# Patient Record
Sex: MALE | Race: BLACK OR AFRICAN AMERICAN | NOT HISPANIC OR LATINO | RURAL
[De-identification: names, ages, dates, MRNs, and addresses within clinical notes are randomized per-mention and may not be internally consistent; named-entity substitution may affect disease eponyms.]

---

## 2023-01-01 ENCOUNTER — HOSPITAL ENCOUNTER (EMERGENCY)
Facility: HOSPITAL | Age: 43
Discharge: HOME | End: 2023-08-18
Attending: EMERGENCY MEDICINE
Payer: COMMERCIAL

## 2023-01-01 ENCOUNTER — TELEPHONE (OUTPATIENT)
Dept: EMERGENCY MEDICINE | Facility: HOSPITAL | Age: 43
End: 2023-01-01
Payer: COMMERCIAL

## 2023-01-01 VITALS
SYSTOLIC BLOOD PRESSURE: 98 MMHG | HEIGHT: 74 IN | BODY MASS INDEX: 25.67 KG/M2 | TEMPERATURE: 99 F | WEIGHT: 200 LBS | HEART RATE: 99 BPM | RESPIRATION RATE: 18 BRPM | OXYGEN SATURATION: 98 % | DIASTOLIC BLOOD PRESSURE: 67 MMHG

## 2023-01-01 DIAGNOSIS — I95.9 HYPOTENSION: Primary | ICD-10-CM

## 2023-01-01 DIAGNOSIS — E86.0 DEHYDRATION: ICD-10-CM

## 2023-01-01 DIAGNOSIS — R73.9 HYPERGLYCEMIA: ICD-10-CM

## 2023-01-01 LAB
ALBUMIN SERPL BCP-MCNC: 3.9 G/DL (ref 3.5–5)
ALBUMIN/GLOB SERPL: 0.9 {RATIO}
ALP SERPL-CCNC: 70 U/L (ref 45–115)
ALT SERPL W P-5'-P-CCNC: 27 U/L (ref 16–61)
ANION GAP SERPL CALCULATED.3IONS-SCNC: 12 MMOL/L (ref 7–16)
AST SERPL W P-5'-P-CCNC: 18 U/L (ref 15–37)
BACTERIA #/AREA URNS HPF: NORMAL /HPF
BASOPHILS # BLD AUTO: 0.02 K/UL (ref 0–0.2)
BASOPHILS NFR BLD AUTO: 0.4 % (ref 0–1)
BILIRUB SERPL-MCNC: 0.5 MG/DL (ref ?–1.2)
BILIRUB UR QL STRIP: NEGATIVE
BUN SERPL-MCNC: 63 MG/DL (ref 7–18)
BUN/CREAT SERPL: 33 (ref 6–20)
CALCIUM SERPL-MCNC: 9.5 MG/DL (ref 8.5–10.1)
CHLORIDE SERPL-SCNC: 101 MMOL/L (ref 98–107)
CK SERPL-CCNC: 87 U/L (ref 39–308)
CLARITY UR: CLEAR
CO2 SERPL-SCNC: 26 MMOL/L (ref 21–32)
COLOR UR: YELLOW
CREAT SERPL-MCNC: 1.91 MG/DL (ref 0.7–1.3)
DIFFERENTIAL METHOD BLD: ABNORMAL
EGFR (NO RACE VARIABLE) (RUSH/TITUS): 45 ML/MIN/1.73M2
EOSINOPHIL # BLD AUTO: 0.12 K/UL (ref 0–0.5)
EOSINOPHIL NFR BLD AUTO: 2.5 % (ref 1–4)
EOSINOPHIL NFR BLD MANUAL: 4 % (ref 1–4)
ERYTHROCYTE [DISTWIDTH] IN BLOOD BY AUTOMATED COUNT: 14.8 % (ref 11.5–14.5)
GLOBULIN SER-MCNC: 4.3 G/DL (ref 2–4)
GLUCOSE SERPL-MCNC: 199 MG/DL (ref 74–106)
GLUCOSE UR STRIP-MCNC: NEGATIVE MG/DL
HCT VFR BLD AUTO: 33.5 % (ref 40–54)
HGB BLD-MCNC: 11.8 G/DL (ref 13.5–18)
KETONES UR STRIP-SCNC: NEGATIVE MG/DL
LEUKOCYTE ESTERASE UR QL STRIP: NEGATIVE
LYMPHOCYTES # BLD AUTO: 0.75 K/UL (ref 1–4.8)
LYMPHOCYTES NFR BLD AUTO: 15.5 % (ref 27–41)
LYMPHOCYTES NFR BLD MANUAL: 16 % (ref 27–41)
MCH RBC QN AUTO: 28.2 PG (ref 27–31)
MCHC RBC AUTO-ENTMCNC: 35.2 G/DL (ref 32–36)
MCV RBC AUTO: 80 FL (ref 80–96)
MONOCYTES # BLD AUTO: 0.83 K/UL (ref 0–0.8)
MONOCYTES NFR BLD AUTO: 17.1 % (ref 2–6)
MONOCYTES NFR BLD MANUAL: 18 % (ref 2–6)
MPC BLD CALC-MCNC: 9.4 FL (ref 9.4–12.4)
NEUTROPHILS # BLD AUTO: 3.12 K/UL (ref 1.8–7.7)
NEUTROPHILS NFR BLD AUTO: 64.5 % (ref 53–65)
NEUTS BAND NFR BLD MANUAL: 1 % (ref 1–5)
NEUTS SEG NFR BLD MANUAL: 61 % (ref 50–62)
NITRITE UR QL STRIP: NEGATIVE
NRBC BLD MANUAL-RTO: ABNORMAL %
PH UR STRIP: 5 PH UNITS
PLATELET # BLD AUTO: 218 K/UL (ref 150–400)
PLATELET MORPHOLOGY: NORMAL
POTASSIUM SERPL-SCNC: 4.3 MMOL/L (ref 3.5–5.1)
PROT SERPL-MCNC: 8.2 G/DL (ref 6.4–8.2)
PROT UR QL STRIP: NEGATIVE
RBC # BLD AUTO: 4.19 M/UL (ref 4.6–6.2)
RBC # UR STRIP: ABNORMAL /UL
RBC #/AREA URNS HPF: NORMAL /HPF
RBC MORPH BLD: NORMAL
SODIUM SERPL-SCNC: 135 MMOL/L (ref 136–145)
SP GR UR STRIP: 1.01
SQUAMOUS #/AREA URNS LPF: NORMAL /LPF
TROPONIN I SERPL DL<=0.01 NG/ML-MCNC: 41.9 PG/ML
UROBILINOGEN UR STRIP-ACNC: 0.2 MG/DL
WBC # BLD AUTO: 4.84 K/UL (ref 4.5–11)
WBC #/AREA URNS HPF: NORMAL /HPF

## 2023-01-01 PROCEDURE — 99284 EMERGENCY DEPT VISIT MOD MDM: CPT | Mod: 25

## 2023-01-01 PROCEDURE — 93005 ELECTROCARDIOGRAM TRACING: CPT

## 2023-01-01 PROCEDURE — 99284 PR EMERGENCY DEPT VISIT,LEVEL IV: ICD-10-PCS | Mod: ,,, | Performed by: EMERGENCY MEDICINE

## 2023-01-01 PROCEDURE — 82550 ASSAY OF CK (CPK): CPT | Performed by: EMERGENCY MEDICINE

## 2023-01-01 PROCEDURE — 93010 ELECTROCARDIOGRAM REPORT: CPT | Mod: ,,, | Performed by: FAMILY MEDICINE

## 2023-01-01 PROCEDURE — 80053 COMPREHEN METABOLIC PANEL: CPT | Performed by: EMERGENCY MEDICINE

## 2023-01-01 PROCEDURE — 93010 EKG 12-LEAD: ICD-10-PCS | Mod: ,,, | Performed by: FAMILY MEDICINE

## 2023-01-01 PROCEDURE — 84484 ASSAY OF TROPONIN QUANT: CPT | Performed by: EMERGENCY MEDICINE

## 2023-01-01 PROCEDURE — 25000003 PHARM REV CODE 250: Performed by: EMERGENCY MEDICINE

## 2023-01-01 PROCEDURE — 85025 COMPLETE CBC W/AUTO DIFF WBC: CPT | Performed by: EMERGENCY MEDICINE

## 2023-01-01 PROCEDURE — 96360 HYDRATION IV INFUSION INIT: CPT

## 2023-01-01 PROCEDURE — 81001 URINALYSIS AUTO W/SCOPE: CPT | Performed by: EMERGENCY MEDICINE

## 2023-01-01 PROCEDURE — 99284 EMERGENCY DEPT VISIT MOD MDM: CPT | Mod: ,,, | Performed by: EMERGENCY MEDICINE

## 2023-01-01 RX ORDER — METFORMIN HYDROCHLORIDE 500 MG/1
1000 TABLET ORAL
COMMUNITY
Start: 2023-01-01 | End: 2023-01-01

## 2023-01-01 RX ORDER — VITAMIN E MIXED 400 UNIT
400 CAPSULE ORAL
COMMUNITY
Start: 2023-01-01 | End: 2023-01-01

## 2023-01-01 RX ORDER — ALBUTEROL SULFATE 90 UG/1
AEROSOL, METERED RESPIRATORY (INHALATION)
COMMUNITY
Start: 2022-12-26

## 2023-01-01 RX ORDER — FOSINOPRIL SODIUM 40 MG/1
40 TABLET ORAL
COMMUNITY
Start: 2023-01-01 | End: 2023-01-01

## 2023-01-01 RX ORDER — AMLODIPINE BESYLATE 5 MG/1
5 TABLET ORAL DAILY
Qty: 30 TABLET | Refills: 0 | Status: SHIPPED | OUTPATIENT
Start: 2023-01-01 | End: 2023-01-01

## 2023-01-01 RX ORDER — HYDROCHLOROTHIAZIDE 25 MG/1
25 TABLET ORAL
COMMUNITY
Start: 2023-01-01 | End: 2024-01-01

## 2023-01-01 RX ORDER — ATORVASTATIN CALCIUM 40 MG/1
40 TABLET, FILM COATED ORAL DAILY
COMMUNITY
Start: 2023-01-01

## 2023-01-01 RX ORDER — ERGOCALCIFEROL 1.25 MG/1
50000 CAPSULE ORAL
COMMUNITY
Start: 2023-01-01

## 2023-01-01 RX ORDER — DULOXETIN HYDROCHLORIDE 30 MG/1
30 CAPSULE, DELAYED RELEASE ORAL
COMMUNITY
Start: 2023-01-01 | End: 2023-01-01

## 2023-01-01 RX ADMIN — SODIUM CHLORIDE 1000 ML: 9 INJECTION, SOLUTION INTRAVENOUS at 01:08

## 2023-08-18 NOTE — ED PROVIDER NOTES
Encounter Date: 8/18/2023       History     Chief Complaint   Patient presents with    Hypotension     Low yesterday. Low today     PT IS A 42 YR OLD BM WITH WEAKNESS NOTED YESTERDAY AFTER BEING IN HEAT OUTDOORS AT WORK  WITH BP CHECKED BY SCHOOL NURSE; WITH RECURRENT SYMPTOMS  NOTED TODAY AT WORK WITH BP AGAIN RECHECKED AND NOTED TO BE LOW. PT IS ON HCTZ FOR THE PAST MONTH AND DID TAKE TODAY WITH MONOPRIL AND METFORMIN WITH   PT DENIES N/V, FEVER, CHILLS OR ADDITIONAL SYMPTOMS    Past Medical History  Diagnosis Date Comments  Diabetes mellitus [E11.9]    Hypertension [I10]    Hypercholesterolemia [E78.00]    Asthma [J45.909]                Review of patient's allergies indicates:  No Known Allergies  Past Medical History:   Diagnosis Date    Asthma     Diabetes mellitus     Hypercholesterolemia     Hypertension      Past Surgical History:   Procedure Laterality Date    cervial surgery      spinal stenosis     History reviewed. No pertinent family history.  Social History     Tobacco Use    Smoking status: Former     Current packs/day: 0.00     Types: Cigarettes    Smokeless tobacco: Never   Substance Use Topics    Alcohol use: Yes     Comment: socially    Drug use: Never     Review of Systems   Constitutional:  Positive for activity change and fatigue.   HENT: Negative.     Eyes: Negative.    Respiratory: Negative.     Cardiovascular: Negative.    Gastrointestinal: Negative.    Endocrine: Negative.    Genitourinary: Negative.    Musculoskeletal: Negative.    Skin: Negative.    Allergic/Immunologic: Positive for immunocompromised state (DM2).   Neurological:  Positive for weakness.   Hematological: Negative.    Psychiatric/Behavioral: Negative.         Physical Exam     Initial Vitals [08/18/23 1234]   BP Pulse Resp Temp SpO2   112/71 108 18 98.7 °F (37.1 °C) 100 %      MAP       --         Physical Exam    Nursing note and vitals reviewed.  Constitutional: He appears well-developed and well-nourished. He is  cooperative. He appears distressed.   HENT:   Head: Normocephalic and atraumatic.   Right Ear: External ear normal.   Left Ear: External ear normal.   Nose: Nose normal.   Mouth/Throat: Oropharynx is clear and moist. No oropharyngeal exudate.   Eyes: Conjunctivae and EOM are normal. Pupils are equal, round, and reactive to light.   Neck: Trachea normal. Neck supple.   Normal range of motion.  Cardiovascular:  Normal rate, regular rhythm, normal heart sounds and intact distal pulses.           No murmur heard.  Pulses:       Radial pulses are 3+ on the right side and 3+ on the left side.        Dorsalis pedis pulses are 3+ on the right side and 3+ on the left side.   Pulmonary/Chest: Breath sounds normal. No respiratory distress. He has no wheezes.   Abdominal: Abdomen is soft. Bowel sounds are normal. He exhibits no distension. There is no abdominal tenderness. There is no rebound.   Musculoskeletal:         General: Normal range of motion.      Right shoulder: Normal.      Left shoulder: Normal.      Right upper arm: Normal.      Left upper arm: Normal.      Right elbow: Normal.      Left elbow: Normal.      Right forearm: Normal.      Left forearm: Normal.      Right wrist: Normal.      Left wrist: Normal.      Right hand: Normal.      Left hand: Normal.      Cervical back: Normal range of motion and neck supple.     Lymphadenopathy:     He has no cervical adenopathy.     He has no axillary adenopathy.   Neurological: He is alert and oriented to person, place, and time. He has normal strength. No cranial nerve deficit or sensory deficit. He displays a negative Romberg sign. GCS eye subscore is 4. GCS verbal subscore is 5. GCS motor subscore is 6.   Skin: Skin is warm and dry. Capillary refill takes less than 2 seconds. No erythema.   Psychiatric: He has a normal mood and affect. His speech is normal and behavior is normal. Judgment and thought content normal. Cognition and memory are normal.         Medical  Screening Exam   See Full Note    ED Course   Procedures  Labs Reviewed   URINALYSIS - Abnormal; Notable for the following components:       Result Value    Blood, UA Trace-Intact (*)     All other components within normal limits   COMPREHENSIVE METABOLIC PANEL - Abnormal; Notable for the following components:    Sodium 135 (*)     Glucose 199 (*)     BUN 63 (*)     Creatinine 1.91 (*)     BUN/Creatinine Ratio 33 (*)     Globulin 4.3 (*)     eGFR 45 (*)     All other components within normal limits   CBC WITH DIFFERENTIAL - Abnormal; Notable for the following components:    RBC 4.19 (*)     Hemoglobin 11.8 (*)     Hematocrit 33.5 (*)     RDW 14.8 (*)     Lymphocytes % 15.5 (*)     Lymphocytes, Absolute 0.75 (*)     Monocytes % 17.1 (*)     Monocytes, Absolute 0.83 (*)     All other components within normal limits   MANUAL DIFFERENTIAL - Abnormal; Notable for the following components:    Lymphocytes, Man % 16 (*)     Monocytes, Man % 18 (*)     All other components within normal limits   TROPONIN I - Normal   CK - Normal   URINALYSIS, MICROSCOPIC - Normal   CBC W/ AUTO DIFFERENTIAL    Narrative:     The following orders were created for panel order CBC Auto Differential.  Procedure                               Abnormality         Status                     ---------                               -----------         ------                     CBC with Differential[[id number]]        Abnormal            Final result               Manual Differential[[id number]]          Abnormal            Final result                 Please view results for these tests on the individual orders.     EKG Readings: (Independently Interpreted)   Initial Reading: No STEMI. Rhythm: Normal Sinus Rhythm. Ectopy: No Ectopy. Conduction: Normal. Clinical Impression: Normal Sinus Rhythm       Imaging Results    None          Medications   sodium chloride 0.9% bolus 1,000 mL 1,000 mL (0 mLs Intravenous Stopped 8/18/23 1420)     Medical Decision  Making  PT IS A 42 YR OLD BM WITH WEAKNESS NOTED YESTERDAY AFTER BEING IN HEAT OUTDOORS AT WORK  WITH BP CHECKED BY SCHOOL NURSE; WITH RECURRENT SYMPTOMS  NOTED TODAY AT WORK WITH BP AGAIN RECHECKED AND NOTED TO BE LOW. PT IS ON HCTZ FOR THE PAST MONTH AND DID TAKE TODAY WITH MONOPRIL AND METFORMIN WITH   PT DENIES N/V, FEVER, CHILLS OR ADDITIONAL SYMPTOMS    Amount and/or Complexity of Data Reviewed  External Data Reviewed: labs.     Details: Viewed and Other Results - Labs    Updated   Order   08/18/23 1440  Urinalysis, Microscopic   Collected: 08/18/23 1426  Final result  Specimen: Urine, Clean Catch      WBC, UA 0-5 /hpf  RBC, UA 0-3 /hpf  Bacteria, UA Rare /hpf  Squamous Epithelial Cells, UA Rare /lpf       08/18/23 1434  Urinalysis   Collected: 08/18/23 1426  Final result  Specimen: Urine, Clean Catch      Color, UA Yellow  Clarity, UA Clear  pH, UA 5.0 pH Units  Leukocytes, UA Negative  Nitrites, UA Negative  Protein, UA Negative  Glucose, UA Negative mg/dL  Ketones, UA Negative mg/dL  Urobilinogen, UA 0.2 mg/dL  Bilirubin, UA Negative  Blood, UA Trace-Intact Abnormal   Specific Gravity, UA 1.010       08/18/23 1342  CK   Collected: 08/18/23 1331  Final result  Specimen: Blood      CK 87 U/L       08/18/23 1341  Troponin I   Collected: 08/18/23 1313  Final result  Specimen: Blood      Troponin I High Sensitivity 41.9 pg/mL       08/18/23 1341  Comprehensive metabolic panel   Collected: 08/18/23 1313  Final result  Specimen: Blood      Sodium 135 Low  mmol/L  Potassium 4.3 mmol/L  Chloride 101 mmol/L  CO2 26 mmol/L  Anion Gap 12 mmol/L  Glucose 199 High  mg/dL  BUN 63 High  mg/dL  Creatinine 1.91 High  mg/dL  BUN/Creatinine Ratio 33 High   Calcium 9.5 mg/dL  Total Protein 8.2 g/dL  Albumin 3.9 g/dL  Globulin 4.3 High  g/dL  A/G Ratio 0.9  Bilirubin, Total 0.5 mg/dL  Alk Phos 70 U/L  ALT 27 U/L  AST 18 U/L  eGFR 45 Low  mL/min/1.73m2       08/18/23 1339  CBC Auto Differential   Collected:  08/18/23 1313  Final result  Specimen: Blood         08/18/23 1339  CBC with Differential   Collected: 08/18/23 1313  Final result  Specimen: Blood      WBC 4.84 K/uL  RBC 4.19 Low  M/uL  Hemoglobin 11.8 Low  g/dL  Hematocrit 33.5 Low  %  MCV 80.0 fL  MCH 28.2 pg  MCHC 35.2 g/dL  RDW 14.8 High  %  Platelet Count 218 K/uL  MPV 9.4 fL  Neutrophils % 64.5 %  Lymphocytes % 15.5 Low  %  Neutrophils, Abs 3.12 K/uL  Lymphocytes, Absolute 0.75 Low  K/uL  Diff Type Manual  Monocytes % 17.1 High  %  Eosinophils % 2.5 %  Basophils % 0.4 %  Monocytes, Absolute 0.83 High  K/uL  Eosinophils, Absolute 0.12 K/uL  Basophils, Absolute 0.02 K/uL       08/18/23 1339  Manual Differential   Collected: 08/18/23 1313  Final result  Specimen: Blood      Segmented Neutrophils, Man % 61 %  Bands, Man % 1 %  Lymphocytes, Man % 16 Low  %  Monocytes, Man % 18 High  %  Eosinophils, Man % 4 %  nRBC, Manual --  Platelet Morphology Normal  RBC Morphology Normal  Labs: ordered.     Details: Viewed and Other Results - Labs    Updated   Order   08/18/23 1440  Urinalysis, Microscopic   Collected: 08/18/23 1426  Final result  Specimen: Urine, Clean Catch      WBC, UA 0-5 /hpf  RBC, UA 0-3 /hpf  Bacteria, UA Rare /hpf  Squamous Epithelial Cells, UA Rare /lpf       08/18/23 1434  Urinalysis   Collected: 08/18/23 1426  Final result  Specimen: Urine, Clean Catch      Color, UA Yellow  Clarity, UA Clear  pH, UA 5.0 pH Units  Leukocytes, UA Negative  Nitrites, UA Negative  Protein, UA Negative  Glucose, UA Negative mg/dL  Ketones, UA Negative mg/dL  Urobilinogen, UA 0.2 mg/dL  Bilirubin, UA Negative  Blood, UA Trace-Intact Abnormal   Specific Gravity, UA 1.010       08/18/23 1342  CK   Collected: 08/18/23 1331  Final result  Specimen: Blood      CK 87 U/L       08/18/23 1341  Troponin I   Collected: 08/18/23 1313  Final result  Specimen: Blood      Troponin I High Sensitivity 41.9 pg/mL       08/18/23 1341  Comprehensive metabolic panel    Collected: 08/18/23 1313  Final result  Specimen: Blood      Sodium 135 Low  mmol/L  Potassium 4.3 mmol/L  Chloride 101 mmol/L  CO2 26 mmol/L  Anion Gap 12 mmol/L  Glucose 199 High  mg/dL  BUN 63 High  mg/dL  Creatinine 1.91 High  mg/dL  BUN/Creatinine Ratio 33 High   Calcium 9.5 mg/dL  Total Protein 8.2 g/dL  Albumin 3.9 g/dL  Globulin 4.3 High  g/dL  A/G Ratio 0.9  Bilirubin, Total 0.5 mg/dL  Alk Phos 70 U/L  ALT 27 U/L  AST 18 U/L  eGFR 45 Low  mL/min/1.73m2       08/18/23 1339  CBC Auto Differential   Collected: 08/18/23 1313  Final result  Specimen: Blood         08/18/23 1339  CBC with Differential   Collected: 08/18/23 1313  Final result  Specimen: Blood      WBC 4.84 K/uL  RBC 4.19 Low  M/uL  Hemoglobin 11.8 Low  g/dL  Hematocrit 33.5 Low  %  MCV 80.0 fL  MCH 28.2 pg  MCHC 35.2 g/dL  RDW 14.8 High  %  Platelet Count 218 K/uL  MPV 9.4 fL  Neutrophils % 64.5 %  Lymphocytes % 15.5 Low  %  Neutrophils, Abs 3.12 K/uL  Lymphocytes, Absolute 0.75 Low  K/uL  Diff Type Manual  Monocytes % 17.1 High  %  Eosinophils % 2.5 %  Basophils % 0.4 %  Monocytes, Absolute 0.83 High  K/uL  Eosinophils, Absolute 0.12 K/uL  Basophils, Absolute 0.02 K/uL       08/18/23 1339  Manual Differential   Collected: 08/18/23 1313  Final result  Specimen: Blood      Segmented Neutrophils, Man % 61 %  Bands, Man % 1 %  Lymphocytes, Man % 16 Low  %  Monocytes, Man % 18 High  %  Eosinophils, Man % 4 %  nRBC, Manual --  Platelet Morphology Normal  RBC Morphology Normal  ECG/medicine tests: ordered and independent interpretation performed. Decision-making details documented in ED Course.     Details: NSR  NO STEMI  Discussion of management or test interpretation with external provider(s): EXAM  LABS AS ABOVE  EKG IS NSR , NO STEMI  IVF   POS ORTHOSTATIC BP  IMPROVED DC HOME    Risk  Prescription drug management.  Risk Details: PT IMPROVED AND WILL BE DISCHARGE IS STABLE CONDITION                          Medical Decision Making:    Initial Assessment:   PT IS A 42 YR OLD BM WITH WEAKNESS NOTED YESTERDAY AFTER BEING IN HEAT OUTDOORS AT WORK  WITH BP CHECKED BY SCHOOL NURSE; WITH RECURRENT SYMPTOMS  NOTED TODAY AT WORK WITH BP AGAIN RECHECKED AND NOTED TO BE LOW. PT IS ON HCTZ FOR THE PAST MONTH AND DID TAKE TODAY WITH MONOPRIL AND METFORMIN WITH   PT DENIES N/V, FEVER, CHILLS OR ADDITIONAL SYMPTOMS  Differential Diagnosis:   MEDICATION RELATED ILLNESS  HEAT RELATED ILLNESS  ABNORMAL LABS, EKG, CARDIAC MONITOR  ABNORMAL ORTHOSTATICS  Clinical Tests:   Lab Tests: Ordered and Reviewed       <> Summary of Lab: Results - Labs    Updated   Order   08/18/23 1440  Urinalysis, Microscopic   Collected: 08/18/23 1426  Final result  Specimen: Urine, Clean Catch      WBC, UA 0-5 /hpf  RBC, UA 0-3 /hpf  Bacteria, UA Rare /hpf  Squamous Epithelial Cells, UA Rare /lpf       08/18/23 1434  Urinalysis   Collected: 08/18/23 1426  Final result  Specimen: Urine, Clean Catch      Color, UA Yellow  Clarity, UA Clear  pH, UA 5.0 pH Units  Leukocytes, UA Negative  Nitrites, UA Negative  Protein, UA Negative  Glucose, UA Negative mg/dL  Ketones, UA Negative mg/dL  Urobilinogen, UA 0.2 mg/dL  Bilirubin, UA Negative  Blood, UA Trace-Intact Abnormal   Specific Gravity, UA 1.010       08/18/23 1342  CK   Collected: 08/18/23 1331  Final result  Specimen: Blood      CK 87 U/L       08/18/23 1341  Troponin I   Collected: 08/18/23 1313  Final result  Specimen: Blood      Troponin I High Sensitivity 41.9 pg/mL       08/18/23 1341  Comprehensive metabolic panel   Collected: 08/18/23 1313  Final result  Specimen: Blood      Sodium 135 Low  mmol/L  Potassium 4.3 mmol/L  Chloride 101 mmol/L  CO2 26 mmol/L  Anion Gap 12 mmol/L  Glucose 199 High  mg/dL  BUN 63 High  mg/dL  Creatinine 1.91 High  mg/dL  BUN/Creatinine Ratio 33 High   Calcium 9.5 mg/dL  Total Protein 8.2 g/dL  Albumin 3.9 g/dL  Globulin 4.3 High  g/dL  A/G Ratio 0.9  Bilirubin, Total 0.5 mg/dL  Alk  Phos 70 U/L  ALT 27 U/L  AST 18 U/L  eGFR 45 Low  mL/min/1.73m2       08/18/23 1339  CBC Auto Differential   Collected: 08/18/23 1313  Final result  Specimen: Blood         08/18/23 1339  CBC with Differential   Collected: 08/18/23 1313  Final result  Specimen: Blood      WBC 4.84 K/uL  RBC 4.19 Low  M/uL  Hemoglobin 11.8 Low  g/dL  Hematocrit 33.5 Low  %  MCV 80.0 fL  MCH 28.2 pg  MCHC 35.2 g/dL  RDW 14.8 High  %  Platelet Count 218 K/uL  MPV 9.4 fL  Neutrophils % 64.5 %  Lymphocytes % 15.5 Low  %  Neutrophils, Abs 3.12 K/uL  Lymphocytes, Absolute 0.75 Low  K/uL  Diff Type Manual  Monocytes % 17.1 High  %  Eosinophils % 2.5 %  Basophils % 0.4 %  Monocytes, Absolute 0.83 High  K/uL  Eosinophils, Absolute 0.12 K/uL  Basophils, Absolute 0.02 K/uL       08/18/23 1339  Manual Differential   Collected: 08/18/23 1313  Final result  Specimen: Blood      Segmented Neutrophils, Man % 61 %  Bands, Man % 1 %  Lymphocytes, Man % 16 Low  %  Monocytes, Man % 18 High  %  Eosinophils, Man % 4 %  nRBC, Manual -  Platelet Morphology Normal  RBC Morphology Normal           Medical Tests: Ordered and Reviewed  ED Management:  EXAM  IVF  LABS AS ABOVE ESSENTIALLY NORMAL TROPONIN NL, CK NL  EKG NSR  IMPROVED   DC HOME      INCREASE REST AND FLUIDS  HOLD HCTZ, LISINOPRIL FOR NOW UNTIL CHECKING WITH EDILMA DIAMONDNCER, AND HOLD METFORMIN UNLESS GLUCOSE STARTS TO INCREASE  WILL NEED REPEAT LABS NEXT WEEK  MONITOR BLOOD PRESSURE  AND BLOOD SUGAR TWICE A DAY  TAKE NORVASC IF BP OVER 120/80          Clinical Impression:   Final diagnoses:  [I95.9] Hypotension (Primary)  [E86.0] Dehydration  [R73.9] Hyperglycemia        ED Disposition Condition    Discharge Stable          ED Prescriptions       Medication Sig Dispense Start Date End Date Auth. Provider    amLODIPine (NORVASC) 5 MG tablet Take 1 tablet (5 mg total) by mouth once daily. 30 tablet 8/18/2023 9/17/2023 Eula Guerra MD          Follow-up Information       Follow up With  Specialties Details Why Contact Info    EDILMA HAMILTON FNP  In 3 days If symptoms worsen SOONER TO ER REFORM , Eula Lawrence MD  08/18/23 8532

## 2023-08-18 NOTE — DISCHARGE INSTRUCTIONS
INCREASE REST AND FLUIDS  HOLD HCTZ, LISINOPRIL FOR NOW UNTIL CHECKING WITH EDILMA HAMILTON, AND HOLD METFORMIN UNLESS GLUCOSE STARTS TO INCREASE  WILL NEED REPEAT LABS NEXT WEEK  MONITOR BLOOD PRESSURE  AND BLOOD SUGAR TWICE A DAY  TAKE NORVASC IF BP OVER 120/80

## 2024-01-01 ENCOUNTER — HOSPITAL ENCOUNTER (EMERGENCY)
Facility: HOSPITAL | Age: 44
End: 2024-01-31
Attending: EMERGENCY MEDICINE
Payer: COMMERCIAL

## 2024-01-01 VITALS — WEIGHT: 180 LBS | BODY MASS INDEX: 23.11 KG/M2 | OXYGEN SATURATION: 60 % | TEMPERATURE: 97 F

## 2024-01-01 DIAGNOSIS — I46.9 CARDIAC ARREST: Primary | ICD-10-CM

## 2024-01-01 PROCEDURE — 27202718 HC TUBING, SMART CAPNOLINE PLUS O2

## 2024-01-01 PROCEDURE — G0390 TRAUMA RESPONS W/HOSP CRITI: HCPCS

## 2024-01-01 PROCEDURE — 99900035 HC TECH TIME PER 15 MIN (STAT)

## 2024-01-01 PROCEDURE — 99283 EMERGENCY DEPT VISIT LOW MDM: CPT | Mod: 25

## 2024-01-01 PROCEDURE — 99499 UNLISTED E&M SERVICE: CPT | Mod: ,,, | Performed by: EMERGENCY MEDICINE

## 2024-01-01 PROCEDURE — 27000221 HC OXYGEN, UP TO 24 HOURS

## 2024-01-31 NOTE — ED NOTES
Called [facility] and spoke with Luis, given referral # [id number] and stated she will call back in 30 min.

## 2024-01-31 NOTE — ED NOTES
Called [facility] and updated fuenral home information. Was instructed to prep eyes for possible donation.

## 2024-01-31 NOTE — ED NOTES
Present with dr cardenas talking with pt sister, sister states mother and girlfriend are on their way. Mother will make decisions and  choice. Sister also states pt has a 18yo son

## 2024-01-31 NOTE — ED NOTES
Spoke with luis from [place], informed her I had no new information d/t family not made  home decision. Luis stated to return call as soon as family made  home choice.

## 2024-01-31 NOTE — ED NOTES
Bilateral eyes were irrigated with 10cc of NS. Eyelids were taped shut and bag of ice was placed over eyelids.

## 2024-02-01 NOTE — ED PROVIDER NOTES
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 PT REPORTEDLY COLLAPSED. PT IS TYPE 1 DIABETIC WITH  PTA.   THE SCHOOL NURSE INITIATED CPR  PRIOR TO EMS ARRIVAL.   PT INTUBATED, EPI, BICARBONATE, ACLS FOLLOWED PER EMS WITHOUT RESPONSE PTA.  PT HAD NO KNOWN  HX CARDIAC DISEASE.    Past Medical History    Diagnosis Date Comments  Diabetes mellitus [E11.9]    Hypertension [I10]    Hypercholesterolemia [E78.00]    Asthma [J45.909]                Review of patient's allergies indicates:  No Known Allergies  Past Medical History:   Diagnosis Date    Asthma     Diabetes mellitus     Hypercholesterolemia     Hypertension      Past Surgical History:   Procedure Laterality Date    cervial surgery      spinal stenosis     No family history on file.  Social History     Tobacco Use    Smoking status: Former     Types: Cigarettes    Smokeless tobacco: Never   Substance Use Topics    Alcohol use: Yes     Comment: socially    Drug use: Never     Review of Systems   Unable to perform ROS: Acuity of condition       Physical Exam   HEENT PUPILS FIXED AND DILATED  Initial Vitals   BP Pulse Resp Temp SpO2   01/31/24 1306 01/31/24 1306 01/31/24 1306 01/31/24 1306 01/31/24 1251   (!) 0/0 (!) 0 (!) 0 97.2 °F (36.2 °C) (!) 89 %      MAP       --                    Medical Screening Exam   See Full Note    ED Course   Procedures  Labs Reviewed - No data to display  EKG Readings: (Independently Interpreted)   CARDIAC MONITOR- ASYSTOLE       Imaging Results    None          Medications - No data to display  Medical Decision Making  PT IS A 43 YR OLD BM 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 PT REPORTEDLY COLLAPSED. PT IS TYPE 1 DIABETIC WITH  PTA.   THE SCHOOL NURSE INITIATED CPR  PRIOR TO EMS ARRIVAL.   PT INTUBATED, EPI,  BICARBONATE, ACLS FOLLOWED PER EMS WITHOUT RESPONSE PTA.  PT HAD NO KNOWN  HX CARDIAC DISEASE.    Amount and/or Complexity of Data Reviewed  Discussion of management or test interpretation with external provider(s): EXAM  PT PRONOUNCED ON ARRIVAL  I SPOKE WITH FAMILY REGARDING PT'S DEATH.      Additional MDM:   Cardiac Arrest: The location of cardiac arrest was prehospital. CPR was done prehospital. Intubation occurred prehospital. Post-Intubation: good BS bilaterally. Medications given: Bicarbonate and Epinephrine. Outcome: no response (). The patient's condition was felt to be . Patient pronounced dead at: 1254.                                  Clinical Impression:   Final diagnoses:  [I46.9] Cardiac arrest (Primary)  [R99] Sudden death        ED Disposition Condition    Discharge           ED Prescriptions    None       Follow-up Information    None          Eula Guerra MD  24 [phone]